# Patient Record
Sex: MALE | Race: WHITE
[De-identification: names, ages, dates, MRNs, and addresses within clinical notes are randomized per-mention and may not be internally consistent; named-entity substitution may affect disease eponyms.]

---

## 2020-04-15 ENCOUNTER — HOSPITAL ENCOUNTER (EMERGENCY)
Dept: HOSPITAL 63 - ER | Age: 50
Discharge: HOME | End: 2020-04-15
Payer: COMMERCIAL

## 2020-04-15 VITALS — WEIGHT: 190.26 LBS | BODY MASS INDEX: 23.17 KG/M2 | HEIGHT: 76 IN

## 2020-04-15 VITALS — SYSTOLIC BLOOD PRESSURE: 187 MMHG | DIASTOLIC BLOOD PRESSURE: 126 MMHG

## 2020-04-15 DIAGNOSIS — Z88.8: ICD-10-CM

## 2020-04-15 DIAGNOSIS — R20.2: ICD-10-CM

## 2020-04-15 DIAGNOSIS — K50.90: ICD-10-CM

## 2020-04-15 DIAGNOSIS — M79.602: Primary | ICD-10-CM

## 2020-04-15 LAB
ALBUMIN SERPL-MCNC: 3.4 G/DL (ref 3.4–5)
ALBUMIN/GLOB SERPL: 1 {RATIO} (ref 1–1.7)
ALP SERPL-CCNC: 57 U/L (ref 46–116)
ALT SERPL-CCNC: 23 U/L (ref 16–63)
ANION GAP SERPL CALC-SCNC: 10 MMOL/L (ref 6–14)
AST SERPL-CCNC: 17 U/L (ref 15–37)
BASOPHILS # BLD AUTO: 0 X10^3/UL (ref 0–0.2)
BASOPHILS NFR BLD: 0 % (ref 0–3)
BILIRUB SERPL-MCNC: 0.3 MG/DL (ref 0.2–1)
BUN/CREAT SERPL: 9 (ref 6–20)
CA-I SERPL ISE-MCNC: 13 MG/DL (ref 8–26)
CALCIUM SERPL-MCNC: 8.5 MG/DL (ref 8.5–10.1)
CHLORIDE SERPL-SCNC: 105 MMOL/L (ref 98–107)
CO2 SERPL-SCNC: 23 MMOL/L (ref 21–32)
CREAT SERPL-MCNC: 1.5 MG/DL (ref 0.7–1.3)
EOSINOPHIL NFR BLD: 0.2 X10^3/UL (ref 0–0.7)
EOSINOPHIL NFR BLD: 2 % (ref 0–3)
ERYTHROCYTE [DISTWIDTH] IN BLOOD BY AUTOMATED COUNT: 14.7 % (ref 11.5–14.5)
GFR SERPLBLD BASED ON 1.73 SQ M-ARVRAT: 50.2 ML/MIN
GLOBULIN SER-MCNC: 3.3 G/DL (ref 2.2–3.8)
GLUCOSE SERPL-MCNC: 96 MG/DL (ref 70–99)
HCT VFR BLD CALC: 41.6 % (ref 39–53)
HGB BLD-MCNC: 13.9 G/DL (ref 13–17.5)
LYMPHOCYTES # BLD: 3.4 X10^3/UL (ref 1–4.8)
LYMPHOCYTES NFR BLD AUTO: 39 % (ref 24–48)
MAGNESIUM SERPL-MCNC: 1.9 MG/DL (ref 1.8–2.4)
MCH RBC QN AUTO: 32 PG (ref 25–35)
MCHC RBC AUTO-ENTMCNC: 33 G/DL (ref 31–37)
MCV RBC AUTO: 96 FL (ref 79–100)
MONO #: 0.5 X10^3/UL (ref 0–1.1)
MONOCYTES NFR BLD: 6 % (ref 0–9)
NEUT #: 4.5 X10^3UL (ref 1.8–7.7)
NEUTROPHILS NFR BLD AUTO: 53 % (ref 31–73)
PLATELET # BLD AUTO: 281 X10^3/UL (ref 140–400)
POTASSIUM SERPL-SCNC: 3.5 MMOL/L (ref 3.5–5.1)
PROT SERPL-MCNC: 6.7 G/DL (ref 6.4–8.2)
RBC # BLD AUTO: 4.36 X10^6/UL (ref 4.3–5.7)
SODIUM SERPL-SCNC: 138 MMOL/L (ref 136–145)
WBC # BLD AUTO: 8.7 X10^3/UL (ref 4–11)

## 2020-04-15 PROCEDURE — 84484 ASSAY OF TROPONIN QUANT: CPT

## 2020-04-15 PROCEDURE — 93005 ELECTROCARDIOGRAM TRACING: CPT

## 2020-04-15 PROCEDURE — 70450 CT HEAD/BRAIN W/O DYE: CPT

## 2020-04-15 PROCEDURE — 99285 EMERGENCY DEPT VISIT HI MDM: CPT

## 2020-04-15 PROCEDURE — 70498 CT ANGIOGRAPHY NECK: CPT

## 2020-04-15 PROCEDURE — 85025 COMPLETE CBC W/AUTO DIFF WBC: CPT

## 2020-04-15 PROCEDURE — 83735 ASSAY OF MAGNESIUM: CPT

## 2020-04-15 PROCEDURE — 70496 CT ANGIOGRAPHY HEAD: CPT

## 2020-04-15 PROCEDURE — 36415 COLL VENOUS BLD VENIPUNCTURE: CPT

## 2020-04-15 PROCEDURE — 80053 COMPREHEN METABOLIC PANEL: CPT

## 2020-04-15 PROCEDURE — 72125 CT NECK SPINE W/O DYE: CPT

## 2020-04-15 NOTE — EKG
[facility] [address], [place] [address]

Test Date:    2020-04-15               Test Time:    15:24:15

Pat Name:     ANABELL HALL            Department:   

Patient ID:   [id number]           Room:          

Gender:       M                        Technician:   

:          1970               Requested By: SADI COLLINS

Order Number: [id number]            Reading MD:   Cheng Wilson

                                 Measurements

Intervals                              Axis          

Rate:         87                       P:            56

VA:           178                      QRS:          -5

QRSD:         110                      T:            62

QT:           386                                    

QTc:          471                                    

                           Interpretive Statements

SINUS RHYTHM

LEFT ATRIAL ABNORMALITY

LEFTWARD AXIS





Electronically Signed On 2020 10:07:57 CDT by Cheng Wilson

## 2020-04-15 NOTE — PHYS DOC
Past History


Past Medical History:  Other (Crohn's)


Additional Past Medical Histor:  CROHNS


Past Surgical History:  Appendectomy, Cholecystectomy, Other


Additional Past Surgical Histo:  COLON RESECTION, ILEOSTOMY AND REMOVAL, SEVERAL

CROHNS SX


Alcohol Use:  Occasionally





General Adult


EDM:


Chief Complaint:  NEURO SYMPTOMS/DEFICITS





HPI:


HPI:


49-year-old male significant history of Crohn's disease no longer on 

immunomodulators, who presents for evaluation of sudden onset of left upper 

extremity pain and tingling that began about 45 minutes prior to arrival, round 

2:30 PM this afternoon. He had sudden onset of pain and tingling when attempting

to pick something up off the ground. He states that the pain radiated to his 

left upper chest as well as his left lateral neck. He developed some tingling 

thereafter, much improved at time of presentation, now isolated to the 

fingertips of the left hand. No dyspnea, headache or vision changes, speech 

changes, focal weakness.





Review of Systems:


Review of Systems:


General: No fevers, chills.


Eyes: No blurred vision, diplopia.


ENT: No nasal congestion, sore throat.


CV: No chest pain, edema.


Resp: No shortness of breath, cough.


GI: No abdominal pain, nausea, vomiting.


: No dysuria, hematuria.


Neuro: No headache, dizziness, weakness. Reports tingling.


MSK: No back pain. Reports left upper extremity pain.


Skin: No acute rash, lesion.





Heart Score:


Risk Factors:


Risk Factors:  DM, Current or recent (<one month) smoker, HTN, HLP, family 

history of CAD, obesity.


Risk Scores:


Score 0 - 3:  2.5% MACE over next 6 weeks - Discharge Home


Score 4 - 6:  20.3% MACE over next 6 weeks - Admit for Clinical Observation


Score 7 - 10:  72.7% MACE over next 6 weeks - Early Invasive Strategies





Allergies:


Allergies:





Allergies








Coded Allergies Type Severity Reaction Last Updated Verified


 


  infliximab Allergy Unknown "HEAVY CHEST" 4/15/20 Yes











Physical Exam:


PE:


Gen: NAD. Well nourished. 


Head: NC/AT


Eyes: No scleral icterus. No conjunctival injection. 


ENT: MMM. Posterior OP clear. 


Neck: Supple. NT. Full AROM.


CV: RRR.  Peripheral pulses intact. 


Resp: CTAB.  


Abd: Soft. NT. ND. 


MSK: No peripheral cyanosis. No edema. 


Neuro: A&Ox3. Strength & sensation grossly intact throughout. No fine motor 

weakness. No extremity drift. No dysmetria. No aphasia or dysarthria. No visual 

field cut. No arias-neglect. No facial asymmetry. NIH stroke score 0.


Skin: Warm. Dry.  


Psych: Appropriate mood & affect.





Current Patient Data:


Vital Signs:





                                   Vital Signs








  Date Time  Temp Pulse Resp B/P (MAP) Pulse Ox O2 Delivery O2 Flow Rate FiO2


 


4/15/20 15:15 97.9 89 22 187/126 (146) 97 Room Air  











EKG:


EKG:


EKG at 1524. Sinus rhythm. Heart rate 87. Normal intervals. No STEMI. 

Interpreted by me.





Radiology/Procedures:


Radiology/Procedures:


PROCEDURE: CT HEAD AND CERVICAL SPINE WO





Exam: CT head and cervical spine without contrast


 


INDICATION: Left upper extremity tingling/pain


 


TECHNIQUE: Sequential axial images through the head and cervical spine 


were obtained without the administration of IV contrast.


 


Comparisons: None


 


FINDINGS:


 


Head:


No focal parenchymal lesion or hemorrhage is identified. There is no 


midline shift or sulcal effacement.


 


No acute vascular territory infarction is identified. Gray-white 


distinction is preserved.


 


The ventricular system is within normal limits without compression 


hydrocephalus. The basal cisterns are well maintained.


 


The visualized portions of the paranasal sinuses and mastoid air cells are


well-pneumatized. No acute fractures. Extensive periodontal disease is 


noted.


 


Cervical spine:


Vertebral body heights and alignment are well-maintained.


 


Fracture through the cervical spine is not identified.


 


No significant spondylotic change in cervical spine.


 


Visualized paraspinal soft tissues are unremarkable.


 


IMPRESSION:


1.  No acute intracranial abnormality.


2.  Negative CT C-spine for acute traumatic injury.


 


Exposure: One or more of the following in the visualized dose reduction 


techniques were utilized for this examination:


1.  Automated exposure control


2.  Adjustment of the MA and/or KV according to patient size


Use of iterative of reconstructive technique


 


Electronically signed by: Juany Lynn MD (4/15/2020 4:26 PM) [id number]











PROCEDURE: CT ANGIOGRAPHY HEAD AND NECK





EXAM: CT Angiogram of the Head and Neck


 


INDICATION: Left upper extremity tingling and pain.


 


TECHNIQUE: CT images were obtained through the head per standard CTA 


protocol. Multiplanar and 3D reformatted images were generated from the CT


dataset on an independent workstation. All CT scans performed at this 


facility utilize dose optimization techniques as appropriate to the exam, 


including the following: Automated exposure control and adjustment of the 


mA and/or KV according to patient size (this includes techniques or 


standardized protocols for targeted exams where dose is indication/reason 


for exam).


 


IV CONTRAST: Administered


 


COMPARISON: None


 


FINDINGS:


 


CTA HEAD: 


No high-grade large vessel stenosis, proximal or branch vessel occlusion, 


aneurysm, or vascular malformation.


 


ANTERIOR CIRCULATION:  Anterior and middle cerebral arteries are widely 


patent.


 


ANTERIOR COMMUNICATING ARTERY:  Patent.


 


POSTERIOR COMMUNICATING ARTERIES:  Present bilaterally and patent.


 


POSTERIOR CIRCULATION:  Vertebral and basilar arteries are widely patent. 


Bilateral posterior inferior cerebellar arteries (PICAs), anterior 


inferior cerebellar arteries (AICAs), and superior cerebellar arteries 


(SCAs) are visualized and patent.


 


OTHER:  No abnormal brain parenchymal enhancement. The paranasal sinuses, 


mastoid air cells, and tympanic cavities are clear.


 


NECK CTA:


 


AORTA:  3 vessel configuration of arch. No dissection or acute aortic 


injury. No hemodynamically significant great vessel origin stenosis.


 


RIGHT CAROTID:  Common and internal carotid arteries are widely patent, 


without evidence of flow limiting stenosis or dissection.


 


LEFT CAROTID:  Common and internal carotid arteries are widely patent, 


without evidence of flow limiting stenosis or dissection.


 


VERTEBRAL ARTERIES: Codominant. No evidence of dissection or flow limiting


stenosis. 


 


SUBCLAVIAN ARTERIES:Subclavian arteries are patent without stenosis.


 


SOFT TISSUES:  Soft tissues are unremarkable. Slightly apices show 


paraseptal pattern emphysema.


 


Where applicable, evaluation of ICA stenosis was performed using NASCET 


criteria, where the site of greatest stenosis is compared to the diameter 


of the ICA distal to the carotid bulb.


 


IMPRESSION:


 


Normal CTA of the head and neck with incidental paraseptal pattern 


emphysema at the lung apices noted..


 


Electronically signed by: Robert Love MD (4/15/2020 4:39 PM) 


[id number]








Course & Med Decision Making:


Course & Med Decision Making


Pertinent Labs and Imaging studies reviewed. (See chart for details)





In summary, 49M p/w LUE pain/tingling off sudden onset when lifting a heavy 

object off the ground. NIHSS zero, no objective paresthesia noted. HDS, 

hypertensive. No tPA given minimal nondebilitating and rapidly improving 

symptoms. Suspect peripheral or radicular etiology at this time, though will 

order typical stroke workup.  Asymptomatic as of 1600. 1651: Noncontrast CT head and cervical spine are negative. CT angiogram of the 

head and neck are unrevealing as well. The patient remains asymptomatic. Given 

the mechanism, with initial onset of pain followed by tingling, now resolved, I 

suspect radicular, brachial plexus, or peripheral etiology. Regardless, if 

indeed this was a TIA event, patient has a low risk ABCD2 score of 3 for 

presenting BP and duration of Sx, suitable for outpatient management. Patient 

will be discharged with outpatient PMD/neuro follow up. Return precautions 

given.





Dragon Disclaimer:


Dragon Disclaimer:


This electronic medical record was generated, in whole or in part, using a voice

recognition dictation system.





Departure


Departure:


Impression:  


   Primary Impression:  


   Pain of left upper extremity


   Additional Impression:  


   Tingling of left upper extremity


Disposition:  01 HOME, SELF-CARE


Condition:  STABLE


Referrals:  


PCP,UNKNOWN (PCP)








CORY FREY MD


Patient Instructions:  Paresthesia, Easy-to-Read





Additional Instructions:  


Your CT head and cervical spine were unremarkable today. Your CT angiogram of t

he head and neck did not show any blood clots or significant disease of the 

blood vessels. 





Please follow up with neurology. Consider taking a daily baby aspirin (81 mg). 





Return to the ED if you develop new or worsening symptoms.











SADI COLLINS DO                    Apr 15, 2020 15:28

## 2020-04-15 NOTE — RAD
Exam: CT head and cervical spine without contrast

 

INDICATION: Left upper extremity tingling/pain

 

TECHNIQUE: Sequential axial images through the head and cervical spine 

were obtained without the administration of IV contrast.

 

Comparisons: None

 

FINDINGS:

 

Head:

No focal parenchymal lesion or hemorrhage is identified. There is no 

midline shift or sulcal effacement.

 

No acute vascular territory infarction is identified. Gray-white 

distinction is preserved.

 

The ventricular system is within normal limits without compression 

hydrocephalus. The basal cisterns are well maintained.

 

The visualized portions of the paranasal sinuses and mastoid air cells are

well-pneumatized. No acute fractures. Extensive periodontal disease is 

noted.

 

Cervical spine:

Vertebral body heights and alignment are well-maintained.

 

Fracture through the cervical spine is not identified.

 

No significant spondylotic change in cervical spine.

 

Visualized paraspinal soft tissues are unremarkable.

 

IMPRESSION:

1.  No acute intracranial abnormality.

2.  Negative CT C-spine for acute traumatic injury.

 

Exposure: One or more of the following in the visualized dose reduction 

techniques were utilized for this examination:

1.  Automated exposure control

2.  Adjustment of the MA and/or KV according to patient size

Use of iterative of reconstructive technique

 

Electronically signed by: Juany Lynn MD (4/15/2020 4:26 PM) [id number]

## 2020-04-15 NOTE — RAD
EXAM: CT Angiogram of the Head and Neck

 

INDICATION: Left upper extremity tingling and pain.

 

TECHNIQUE: CT images were obtained through the head per standard CTA 

protocol. Multiplanar and 3D reformatted images were generated from the CT

dataset on an independent workstation. All CT scans performed at this 

facility utilize dose optimization techniques as appropriate to the exam, 

including the following: Automated exposure control and adjustment of the 

mA and/or KV according to patient size (this includes techniques or 

standardized protocols for targeted exams where dose is indication/reason 

for exam).

 

IV CONTRAST: Administered

 

COMPARISON: None

 

FINDINGS:

 

CTA HEAD: 

No high-grade large vessel stenosis, proximal or branch vessel occlusion, 

aneurysm, or vascular malformation.

 

ANTERIOR CIRCULATION:  Anterior and middle cerebral arteries are widely 

patent.

 

ANTERIOR COMMUNICATING ARTERY:  Patent.

 

POSTERIOR COMMUNICATING ARTERIES:  Present bilaterally and patent.

 

POSTERIOR CIRCULATION:  Vertebral and basilar arteries are widely patent. 

Bilateral posterior inferior cerebellar arteries (PICAs), anterior 

inferior cerebellar arteries (AICAs), and superior cerebellar arteries 

(SCAs) are visualized and patent.

 

OTHER:  No abnormal brain parenchymal enhancement. The paranasal sinuses, 

mastoid air cells, and tympanic cavities are clear.

 

NECK CTA:

 

AORTA:  3 vessel configuration of arch. No dissection or acute aortic 

injury. No hemodynamically significant great vessel origin stenosis.

 

RIGHT CAROTID:  Common and internal carotid arteries are widely patent, 

without evidence of flow limiting stenosis or dissection.

 

LEFT CAROTID:  Common and internal carotid arteries are widely patent, 

without evidence of flow limiting stenosis or dissection.

 

VERTEBRAL ARTERIES: Codominant. No evidence of dissection or flow limiting

stenosis. 

 

SUBCLAVIAN ARTERIES:Subclavian arteries are patent without stenosis.

 

SOFT TISSUES:  Soft tissues are unremarkable. Slightly apices show 

paraseptal pattern emphysema.

 

Where applicable, evaluation of ICA stenosis was performed using NASCET 

criteria, where the site of greatest stenosis is compared to the diameter 

of the ICA distal to the carotid bulb.

 

IMPRESSION:

 

Normal CTA of the head and neck with incidental paraseptal pattern 

emphysema at the lung apices noted..

 

Electronically signed by: Robert Love MD (4/15/2020 4:39 PM) 

[id number]

## 2020-12-07 ENCOUNTER — HOSPITAL ENCOUNTER (EMERGENCY)
Dept: HOSPITAL 63 - ER | Age: 50
Discharge: HOME | End: 2020-12-07
Payer: COMMERCIAL

## 2020-12-07 VITALS
DIASTOLIC BLOOD PRESSURE: 116 MMHG | DIASTOLIC BLOOD PRESSURE: 116 MMHG | SYSTOLIC BLOOD PRESSURE: 173 MMHG | SYSTOLIC BLOOD PRESSURE: 173 MMHG

## 2020-12-07 VITALS — HEIGHT: 76 IN | BODY MASS INDEX: 23.79 KG/M2 | WEIGHT: 195.33 LBS

## 2020-12-07 DIAGNOSIS — Z20.828: ICD-10-CM

## 2020-12-07 DIAGNOSIS — N17.9: ICD-10-CM

## 2020-12-07 DIAGNOSIS — Z90.49: ICD-10-CM

## 2020-12-07 DIAGNOSIS — N18.9: ICD-10-CM

## 2020-12-07 DIAGNOSIS — R79.89: ICD-10-CM

## 2020-12-07 DIAGNOSIS — I12.9: Primary | ICD-10-CM

## 2020-12-07 DIAGNOSIS — Z90.89: ICD-10-CM

## 2020-12-07 DIAGNOSIS — K50.90: ICD-10-CM

## 2020-12-07 LAB
ALBUMIN SERPL-MCNC: 3.9 G/DL (ref 3.4–5)
ALBUMIN/GLOB SERPL: 1 {RATIO} (ref 1–1.7)
ALP SERPL-CCNC: 66 U/L (ref 46–116)
ALT SERPL-CCNC: 25 U/L (ref 16–63)
AMPHETAMINE/METHAMPHETAMINE: (no result)
ANION GAP SERPL CALC-SCNC: 13 MMOL/L (ref 6–14)
APTT PPP: YELLOW S
AST SERPL-CCNC: 19 U/L (ref 15–37)
BACTERIA #/AREA URNS HPF: 0 /HPF
BARBITURATES UR-MCNC: (no result) UG/ML
BASOPHILS # BLD AUTO: 0.1 X10^3/UL (ref 0–0.2)
BASOPHILS NFR BLD: 1 % (ref 0–3)
BENZODIAZ UR-MCNC: (no result) UG/L
BILIRUB SERPL-MCNC: 0.6 MG/DL (ref 0.2–1)
BILIRUB UR QL STRIP: (no result)
BUN/CREAT SERPL: 10 (ref 6–20)
CA-I SERPL ISE-MCNC: 18 MG/DL (ref 8–26)
CALCIUM SERPL-MCNC: 9.2 MG/DL (ref 8.5–10.1)
CANNABINOIDS UR-MCNC: (no result) UG/L
CHLORIDE SERPL-SCNC: 103 MMOL/L (ref 98–107)
CO2 SERPL-SCNC: 24 MMOL/L (ref 21–32)
COCAINE UR-MCNC: (no result) NG/ML
CREAT SERPL-MCNC: 1.8 MG/DL (ref 0.7–1.3)
EOSINOPHIL NFR BLD: 0.1 X10^3/UL (ref 0–0.7)
EOSINOPHIL NFR BLD: 1 % (ref 0–3)
ERYTHROCYTE [DISTWIDTH] IN BLOOD BY AUTOMATED COUNT: 14.9 % (ref 11.5–14.5)
FIBRINOGEN PPP-MCNC: CLEAR MG/DL
GFR SERPLBLD BASED ON 1.73 SQ M-ARVRAT: 40.5 ML/MIN
GLOBULIN SER-MCNC: 3.8 G/DL (ref 2.2–3.8)
GLUCOSE SERPL-MCNC: 92 MG/DL (ref 70–99)
GLUCOSE UR STRIP-MCNC: (no result) MG/DL
HCT VFR BLD CALC: 44.2 % (ref 39–53)
HGB BLD-MCNC: 14.5 G/DL (ref 13–17.5)
LYMPHOCYTES # BLD: 3.5 X10^3/UL (ref 1–4.8)
LYMPHOCYTES NFR BLD AUTO: 35 % (ref 24–48)
MAGNESIUM SERPL-MCNC: 1.9 MG/DL (ref 1.8–2.4)
MCH RBC QN AUTO: 32 PG (ref 25–35)
MCHC RBC AUTO-ENTMCNC: 33 G/DL (ref 31–37)
MCV RBC AUTO: 99 FL (ref 79–100)
METHADONE SERPL-MCNC: (no result) NG/ML
MONO #: 0.7 X10^3/UL (ref 0–1.1)
MONOCYTES NFR BLD: 7 % (ref 0–9)
NEUT #: 5.8 X10^3UL (ref 1.8–7.7)
NEUTROPHILS NFR BLD AUTO: 57 % (ref 31–73)
NITRITE UR QL STRIP: (no result)
OPIATES UR-MCNC: (no result) NG/ML
PCP SERPL-MCNC: (no result) MG/DL
PLATELET # BLD AUTO: 336 X10^3/UL (ref 140–400)
POTASSIUM SERPL-SCNC: 3.6 MMOL/L (ref 3.5–5.1)
PROT SERPL-MCNC: 7.7 G/DL (ref 6.4–8.2)
RBC # BLD AUTO: 4.48 X10^6/UL (ref 4.3–5.7)
RBC #/AREA URNS HPF: 0 /HPF (ref 0–2)
SODIUM SERPL-SCNC: 140 MMOL/L (ref 136–145)
SP GR UR STRIP: 1.01
SQUAMOUS #/AREA URNS LPF: (no result) /LPF
UROBILINOGEN UR-MCNC: 0.2 MG/DL
WBC # BLD AUTO: 10.3 X10^3/UL (ref 4–11)
WBC #/AREA URNS HPF: 0 /HPF (ref 0–4)

## 2020-12-07 PROCEDURE — 93005 ELECTROCARDIOGRAM TRACING: CPT

## 2020-12-07 PROCEDURE — 71045 X-RAY EXAM CHEST 1 VIEW: CPT

## 2020-12-07 PROCEDURE — 84484 ASSAY OF TROPONIN QUANT: CPT

## 2020-12-07 PROCEDURE — 99285 EMERGENCY DEPT VISIT HI MDM: CPT

## 2020-12-07 PROCEDURE — 85610 PROTHROMBIN TIME: CPT

## 2020-12-07 PROCEDURE — 80307 DRUG TEST PRSMV CHEM ANLYZR: CPT

## 2020-12-07 PROCEDURE — 80053 COMPREHEN METABOLIC PANEL: CPT

## 2020-12-07 PROCEDURE — 85730 THROMBOPLASTIN TIME PARTIAL: CPT

## 2020-12-07 PROCEDURE — 96374 THER/PROPH/DIAG INJ IV PUSH: CPT

## 2020-12-07 PROCEDURE — 85025 COMPLETE CBC W/AUTO DIFF WBC: CPT

## 2020-12-07 PROCEDURE — C9803 HOPD COVID-19 SPEC COLLECT: HCPCS

## 2020-12-07 PROCEDURE — 83735 ASSAY OF MAGNESIUM: CPT

## 2020-12-07 PROCEDURE — 83880 ASSAY OF NATRIURETIC PEPTIDE: CPT

## 2020-12-07 PROCEDURE — U0003 INFECTIOUS AGENT DETECTION BY NUCLEIC ACID (DNA OR RNA); SEVERE ACUTE RESPIRATORY SYNDROME CORONAVIRUS 2 (SARS-COV-2) (CORONAVIRUS DISEASE [COVID-19]), AMPLIFIED PROBE TECHNIQUE, MAKING USE OF HIGH THROUGHPUT TECHNOLOGIES AS DESCRIBED BY CMS-2020-01-R: HCPCS

## 2020-12-07 PROCEDURE — 70450 CT HEAD/BRAIN W/O DYE: CPT

## 2020-12-07 PROCEDURE — 36415 COLL VENOUS BLD VENIPUNCTURE: CPT

## 2020-12-07 PROCEDURE — 82553 CREATINE MB FRACTION: CPT

## 2020-12-07 PROCEDURE — 84443 ASSAY THYROID STIM HORMONE: CPT

## 2020-12-07 PROCEDURE — 81001 URINALYSIS AUTO W/SCOPE: CPT

## 2020-12-07 NOTE — RAD
PORTABLE CHEST 1V 12/7/2020 1:31 PM

 

INDICATION: Headache

 

COMPARISON: None available

 

TECHNIQUE: Portable frontal view of the chest is provided.

 

FINDINGS:

 

The cardiomediastinal silhouette is within normal limits. Lungs are clear.

Left costophrenic angle is partly excluded. Minimal right apical 

pleural-parenchymal scarring.

 

There are no significant pleural effusions. There is no pulmonary vascular

congestion. No pneumothorax.

 

No suspicious osseous abnormality.

 

IMPRESSION:

 

No acute cardiopulmonary process.

 

Electronically signed by: Raiza Cuellar MD (12/7/2020 2:20 PM) 

JANNETTE

## 2020-12-07 NOTE — EKG
[facility] [address], [place] [address]

Test Date:    2020               Test Time:    13:49:37

Pat Name:     ANABELL HALL            Department:   

Patient ID:   [id number]           Room:          

Gender:       M                        Technician:   EKATERINA

:          1970               Requested By: OLIVERIO HERNANDEZ

Order Number: [id number]            Reading MD:     

                                 Measurements

Intervals                              Axis          

Rate:         99                       P:            54

TX:           180                      QRS:          -12

QRSD:         110                      T:            56

QT:           366                                    

QTc:          469                                    

                           Interpretive Statements

SINUS RHYTHM

LEFTWARD AXIS

R-S TRANSITION ZONE IN V LEADS DISPLACED TO THE LEFT

NO SPECIFIC ECG ABNORMALITIES

RI6.02

No previous ECG available for comparison

## 2020-12-07 NOTE — RAD
CT HEAD WO CONTRAST 

 

Date: 12/7/2020 1:31 PM 

 

Clinical Indication: Reason: headache / Spl. Instructions:  / History: 

 

Comparison: 4/15/2020.

 

Technique:  5 mm axial tomographic images were obtained of the head 

without contrast. These were viewed on brain and bone windows. One or more

of the following dose reduction techniques were utilized: Automated 

exposure control (AEC), Adjustment of mA and/or kV according to patient 

size, Use of iterative reconstruction technique such as ASiR, CT scan done

according to ALARA and image gently/image wisely

 

Findings:

 

The brain parenchyma is normal in attenuation. No intra- or extra-axial 

mass or fluid collection. No acute hemorrhage. The ventricles are normal 

in size, shape, and morphology. The gray-white matter junction is normal. 

The subarachnoid cisterns are patent. 

 

The visualized paranasal sinuses are normal.  The visualized portions of 

the orbits and globes are normal. The mastoid air cells are clear. 

 

The  topogram shows no lytic lesion or fracture. 

 

Impression:

 

No acute intracranial process.

 

Electronically signed by: Edgar Bowling MD (12/7/2020 2:20 PM) [id number]

## 2020-12-07 NOTE — PHYS DOC
Past History


Past Medical History:  Other


Additional Past Medical Histor:  CROHNS


Past Surgical History:  Appendectomy, Cholecystectomy, Other


Additional Past Surgical Histo:  COLON RESECTION, ILEOSTOMY AND REMOVAL, SEVERAL

CROHNS SX


Alcohol Use:  Occasionally





Adult General


Chief Complaint


Chief Complaint:  HEADACHE





HPI


HPI





Patient is a male with history of colon resection from Crohn's disease who 

presents the ED today complaining of mild intermittent right forehead headache 

that has been going on and off since Saturday.  Patient denies this being the 

worst headache in his life.  Denies any nausea vomiting.  Denies any neck pain 

or fever.  Denies anything specifically exacerbating or relieving his symptoms.





Review of Systems


Review of Systems





Constitutional: Denies fever or chills []


Eyes: Denies change in visual acuity, redness, or eye pain []


HENT: Denies nasal congestion or sore throat []


Respiratory: Denies cough or shortness of breath []


Cardiovascular: No additional information not addressed in HPI []


GI: Denies abdominal pain, nausea, vomiting, bloody stools or diarrhea []


: Denies dysuria or hematuria []


Musculoskeletal: Denies back pain or joint pain []


Integument: Denies rash or skin lesions []


Neurologic: Reports right-sided headache focal weakness or sensory changes []








All other systems were reviewed and found to be within normal limits, except as 

documented in this note.





Allergies


Allergies





Allergies








Coded Allergies Type Severity Reaction Last Updated Verified


 


  infliximab Allergy Unknown "HEAVY CHEST" 12/7/20 Yes











Physical Exam


Physical Exam





Constitutional: Well developed, well nourished, no acute distress, non-toxic 

appearance. []


HENT: Normocephalic, atraumatic, bilateral external ears normal, oropharynx 

moist, no oral exudates, nose normal. []


Eyes: PERRLA, EOMI, conjunctiva normal, no discharge. [] 


Neck: Normal range of motion, no tenderness, supple, no stridor. [] 


Cardiovascular:Heart rate regular rhythm, no murmur []


Lungs & Thorax:  Bilateral breath sounds clear to auscultation []


Abdomen: Bowel sounds normal, soft, no tenderness, no masses, no pulsatile 

masses. [] 


Skin: Warm, dry, no erythema, no rash. [] 


Back: No tenderness, no CVA tenderness. [] 


Extremities: No tenderness, no cyanosis, no clubbing, ROM intact, no edema. [] 


Neurologic: Alert and oriented X 3, normal motor function, normal sensory 

function, no focal deficits noted.  Cranial nerves II through XII intact


Psychologic: Affect normal, judgement normal, mood normal. []





Current Patient Data


Vital Signs





                                   Vital Signs








  Date Time  Temp Pulse Resp B/P (MAP) Pulse Ox O2 Delivery O2 Flow Rate FiO2


 


12/7/20 13:28 98.4 105 18 158/115 (129) 97 Room Air  











EKG


EKG


1349 interpreted by Dr. Hutton sinus rhythm with left ventricular hypertrophy 

no STEMI[]





Radiology/Procedures


Radiology/Procedures


[]





Heart Score


Risk Factors:


Risk Factors:  DM, Current or recent (<one month) smoker, HTN, HLP, family 

history of CAD, obesity.


Risk Scores:


Risk Factors:  DM, Current or recent (<one month) smoker, HTN, HLP, family 

history of CAD, obesity.





Course & Med Decision Making


Course & Med Decision Making


Pertinent Labs and Imaging studies reviewed. (See chart for details)





This is a 50-year-old male patient presenting to the ED today with right-sided 

headache that began Saturday.





Dragon Disclaimer


Dragon Disclaimer


This electronic medical record was generated, in whole or in part, using a voice

 recognition dictation system.





Departure


Departure:


Impression:  


   Primary Impression:  


   Accelerated hypertension


   Additional Impressions:  


   Acute on chronic renal failure


   Elevated brain natriuretic peptide (BNP) level


Disposition:  01 DC HOME SELF CARE/HOMELESS


Condition:  STABLE


Referrals:  


PCP,NO (PCP)


follow up with your doctor in the course of this week


Patient Instructions:  Hypertension-[facility]





Additional Instructions:  


You have high blood pressure. Please contact your doctor and start following up.

 Your kidney function is not that good either your creatinine is 1.8 normal is 

0.7-1.3 . Your BNP was high at 5520 which can be an early sign of heart failure 

or kidney failure.  Take the blood pressure  medicines as ordered. Come back to 

the Ed at any point you symptoms worsen.


Scripts


Amlodipine Besylate (AMLODIPINE BESYLATE) 10 Mg Tablet


1 TAB PO DAILY, #21 TAB


   Prov: OLIVERIO HERNANDEZ         12/7/20





Problem Qualifiers








   Additional Impressions:  


   Acute on chronic renal failure


   Acute renal failure type:  unspecified  Chronic kidney disease stage:  

   unspecified stage  Qualified Codes:  N17.9 - Acute kidney failure, 

   unspecified; N18.9 - Chronic kidney disease, unspecified








OLIVERIO HERNANDEZ               Dec 7, 2020 14:18

## 2021-02-02 ENCOUNTER — HOSPITAL ENCOUNTER (OUTPATIENT)
Dept: HOSPITAL 63 - US | Age: 51
End: 2021-02-02
Attending: FAMILY MEDICINE
Payer: COMMERCIAL

## 2021-02-02 DIAGNOSIS — N18.31: Primary | ICD-10-CM

## 2021-02-02 PROCEDURE — 76770 US EXAM ABDO BACK WALL COMP: CPT

## 2021-02-02 NOTE — RAD
INDICATION: Reason: CKD STAGE 3 / Spl. Instructions:  / History: 



COMPARISON: None.



TECHNIQUE: Grayscale and color ultrasound images obtained of the bilateral kidneys and bladder.



FINDINGS: 



Right Kidney: 95 mm. No hydronephrosis.

Left Kidney: 84 mm. No hydronephrosis.

Bladder: Decompressed



IMPRESSION: 



*  No hydronephrosis bilaterally.



Electronically signed by: Gerardo Pena MD (2/2/2021 5:18 PM) [id number]

## 2021-04-09 ENCOUNTER — HOSPITAL ENCOUNTER (EMERGENCY)
Dept: HOSPITAL 63 - ER | Age: 51
Discharge: HOME | End: 2021-04-09
Payer: COMMERCIAL

## 2021-04-09 VITALS — BODY MASS INDEX: 23.79 KG/M2 | HEIGHT: 76 IN | WEIGHT: 195.33 LBS

## 2021-04-09 VITALS — DIASTOLIC BLOOD PRESSURE: 94 MMHG | SYSTOLIC BLOOD PRESSURE: 157 MMHG

## 2021-04-09 DIAGNOSIS — Z88.8: ICD-10-CM

## 2021-04-09 DIAGNOSIS — K50.90: ICD-10-CM

## 2021-04-09 DIAGNOSIS — I10: Primary | ICD-10-CM

## 2021-04-09 PROCEDURE — 99283 EMERGENCY DEPT VISIT LOW MDM: CPT

## 2021-04-09 NOTE — PHYS DOC
Past History


Past Medical History:  Other


Additional Past Medical Histor:  CROHNS


Past Surgical History:  Appendectomy, Cholecystectomy, Other


Additional Past Surgical Histo:  COLON RESECTION, ILEOSTOMY AND REMOVAL, SEVERAL

CROHNS SX


Alcohol Use:  Occasionally





General Adult


EDM:


Chief Complaint:  HYPERTENSION





HPI:


HPI:


50-year-old male with significant history of hypertension on amlodipine 10 mg 

daily, Crohn's disease, who presents for evaluation of asymptomatic 

hypertension.  The patient states that he checked his blood pressure prior to 

arrival, and his blood pressure was noted to be elevated at 189 systolic.  He 

took his amlodipine as scheduled for this morning at around 0530.  The patient 

has a follow-up appointment with his primary physician on Monday to discuss 

starting a second agent, likely losartan.  Patient denies any headache, neck 

pain or stiffness, chest pain or dyspnea, abdominal pain, nausea, vomiting, 

focal weakness or paresthesia.





Review of Systems:


Review of Systems:


Gen: No fever, chills. 


Eyes: No blurred vision, diplopia. 


ENT: No nasal congestion, sore throat. 


CV: No CP, palpitations. 


Resp. No SOB, cough.


GI: No abd pain, N/V.


: No dysuria, hematuria. 


Neuro: No HA, dizziness, weakness.


MSK: No myalgia, arthralgia, back pain. 


Remainder of systems reviewed and negative unless otherwise specified.





Allergies:


Allergies:





Allergies








Coded Allergies Type Severity Reaction Last Updated Verified


 


  infliximab Allergy Unknown "HEAVY CHEST" 12/7/20 Yes











Physical Exam:


PE:


Gen: NAD. Well nourished. 


Head: NC/AT. 


Eyes: No scleral icterus. No conjunctival injection.  PERRL.  EOMI.  No 

inducible nystagmus.


ENT: MMM. Posterior OP clear. 


Neck: Supple. NT. No JVD.  No meningismus.


CV: RRR.   Peripheral pulses intact. 


Resp: CTAB.  


Abd: Soft. NT. ND. 


MSK: No peripheral cyanosis. No edema. 


Neuro: A&Ox3. Strength & sensation grossly intact throughout.  No dysmetria.


Skin. Warm. Dry. No acute rash. 


Psych: Appropriate mood & affect.





EKG:


EKG:


[]





Radiology/Procedures:


Radiology/Procedures:


[]





Heart Score:


C/O Chest Pain:  N/A


Risk Factors:


Risk Factors:  DM, Current or recent (<one month) smoker, HTN, HLP, family 

history of CAD, obesity.


Risk Scores:


Score 0 - 3:  2.5% MACE over next 6 weeks - Discharge Home


Score 4 - 6:  20.3% MACE over next 6 weeks - Admit for Clinical Observation


Score 7 - 10:  72.7% MACE over next 6 weeks - Early Invasive Strategies





Course & Med Decision Making:


Course & Med Decision Making


Pertinent Labs and Imaging studies reviewed. (See chart for details)





In summary, 50-year-old male with history of hypertension on amlodipine 10 mg, 

who presents for evaluation of asymptomatic hypertension.  No acute medical 

complaints.  No clinical signs or symptoms concerning for endorgan failure.  No 

additional work-up per [facility] clinical policy on asymptomatic hypertension.  After

 a shared decision-making discussion, the patient has opted to go home, which I 

feel would be reasonable.  The patient will be started on a low-dose of losartan

 25 mg daily until he can be seen by his primary physician on Monday.  Outpat

ient follow-up.  Return precautions given.





Dragon Disclaimer:


Dragon Disclaimer:


This electronic medical record was generated, in whole or in part, using a voice

 recognition dictation system.





Departure


Departure:


Impression:  


   Primary Impression:  


   Hypertension


Disposition:  01 DC HOME SELF CARE/HOMELESS


Condition:  STABLE


Referrals:  


DANIE CASTANEDA MD (PCP)


Patient Instructions:  Hypertension, Easy-to-Read





Additional Instructions:  


Follow-up as scheduled with your primary care physician on Monday for further 

evaluation of your hypertension.


Scripts


Losartan Potassium (LOSARTAN POTASSIUM **) 25 Mg Tablet


25 MG PO DAILY for HYPERTENSION, #14 TAB


   Prov: SADI COLLINS DO         4/9/21











SADI COLLINS DO                     Apr 9, 2021 21:17

## 2021-04-23 ENCOUNTER — HOSPITAL ENCOUNTER (OUTPATIENT)
Dept: HOSPITAL 61 - ECHO | Age: 51
End: 2021-04-23
Payer: COMMERCIAL

## 2021-04-23 DIAGNOSIS — I11.9: Primary | ICD-10-CM

## 2021-04-23 PROCEDURE — C8929 TTE W OR WO FOL WCON,DOPPLER: HCPCS

## 2021-04-23 NOTE — CARD
MR#: [id number]

Account#: [id number]

Accession#: [id number]

Date of Study: 04/23/2021

Ordering Physician: DANIE CASTANEDA, 

Referring Physician: DANIE CASTANEDA, 

Tech: Ирина Brewer [facility]





--------------- APPROVED REPORT --------------





EXAM: Two-dimensional and M-mode echocardiogram with Doppler and color Doppler.



Other Information 

Quality : Technically LimitedHR: 92bpm

Rhythm : NSR



INDICATION

Dyspnea 



RISK FACTORS

Hypertension 



2D DIMENSIONS 

RVDd2.8 (2.9-3.5cm)Left Atrium(2D)2.8 (1.6-4.0cm)

IVSd1.2 (0.7-1.1cm)Aortic Root(2D)4.1 (2.0-3.7cm)

LVDd5.7 (3.9-5.9cm)LVOT Diameter2.2 (1.8-2.4cm)

PWd1.2 (0.7-1.1cm)LVDs4.5 (2.5-4.0cm)

FS (%) 21.1 %SV67.2 ml

LVEF(%)42.3 (>50%)



Aortic Valve

AoV Peak Flo.137.7cm/sAoV VTI23.0cm

AO Peak GR.7.6mmHgLVOT Peak Flo.94.6cm/s

AO Mean GR.4mmHgAVA (VMAX)2.59cm2



Pulmonary Valve

PV Peak Vhgwnist857.9cm/s



 LEFT VENTRICLE 

The Left Ventricle is mildly dilated. There is normal left ventricular wall thickness. The ejection f
raction is moderately impaired. Estimated ejection fraction 35-40%. Septal motion suggestive of condu
ction defect, otherwise, there is global hypokinesis of the left ventricle.



 RIGHT VENTRICLE 

The right ventricle is normal size. There is normal right ventricular wall thickness. The right ventr
icular systolic function is normal.



 ATRIA 

The left atrium size is normal. The right atrium size is normal. The interatrial septum is intact wit
h no evidence for an atrial septal defect or patent foramen ovale as noted on 2-D or Doppler imaging.




 AORTIC VALVE 

The aortic valve is normal in structure and function. Doppler and Color Flow revealed no significant 
aortic regurgitation. There is no significant aortic valvular stenosis.



 MITRAL VALVE 

The mitral valve is normal in structure and function. There is no evidence of mitral valve prolapse. 
There is no mitral valve stenosis. Doppler and Color-flow revealed trace mitral regurgitation.



 TRICUSPID VALVE 

The tricuspid valve is normal in structure and function. Doppler and Color Flow revealed no tricuspid
 valve regurgitation noted. There is no tricuspid valve stenosis.



 PULMONIC VALVE 

Doppler and Color Flow revealed no pulmonic valvular regurgitation. There is no pulmonic valvular nisa
nosis.



 GREAT VESSELS 

The aortic root is mildly enlarged. The IVC is normal in size and collapses >50% with inspiration.



 PERICARDIAL EFFUSION 

There is no evidence of significant pericardial effusion.



Critical Notification

Critical Value: No



<Conclusion>

The ejection fraction is moderately impaired.  Estimated ejection fraction 35-40%.

Septal motion suggestive of conduction defect, otherwise, there is global hypokinesis of the left kanu
tricle.

The Left Ventricle is mildly dilated.



Signed by : Brian Mott, 

Electronically Approved : 04/23/2021 16:54:15